# Patient Record
Sex: MALE | Race: BLACK OR AFRICAN AMERICAN | Employment: UNEMPLOYED | URBAN - METROPOLITAN AREA
[De-identification: names, ages, dates, MRNs, and addresses within clinical notes are randomized per-mention and may not be internally consistent; named-entity substitution may affect disease eponyms.]

---

## 2017-10-18 ENCOUNTER — HOSPITAL ENCOUNTER (EMERGENCY)
Age: 27
Discharge: HOME | End: 2017-10-18
Attending: EMERGENCY MEDICINE

## 2017-10-18 VITALS
BODY MASS INDEX: 24.38 KG/M2 | WEIGHT: 190 LBS | RESPIRATION RATE: 18 BRPM | SYSTOLIC BLOOD PRESSURE: 143 MMHG | OXYGEN SATURATION: 100 % | HEART RATE: 75 BPM | TEMPERATURE: 98.6 F | DIASTOLIC BLOOD PRESSURE: 77 MMHG | HEIGHT: 74 IN

## 2017-10-18 DIAGNOSIS — S39.012A STRAIN OF LUMBAR REGION, INITIAL ENCOUNTER: Primary | ICD-10-CM
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
## 2017-10-18 DIAGNOSIS — R03.0 ELEVATED BLOOD PRESSURE READING: ICD-10-CM

## 2017-10-18 PROCEDURE — 99283 EMERGENCY DEPT VISIT LOW MDM: CPT

## 2017-10-18 RX ORDER — IBUPROFEN 800 MG/1
800 TABLET ORAL EVERY 8 HOURS PRN
Qty: 30 TABLET | Refills: 0 | Status: SHIPPED | OUTPATIENT
Start: 2017-10-18 | End: 2018-12-31 | Stop reason: ALTCHOICE

## 2017-10-18 RX ORDER — CYCLOBENZAPRINE HCL 10 MG
10 TABLET ORAL 3 TIMES DAILY PRN
Qty: 30 TABLET | Refills: 0 | Status: SHIPPED | OUTPATIENT
Start: 2017-10-18 | End: 2017-10-28

## 2017-10-18 ASSESSMENT — PAIN SCALES - GENERAL: PAINLEVEL_OUTOF10: 6

## 2017-10-18 ASSESSMENT — PAIN DESCRIPTION - ORIENTATION: ORIENTATION: LOWER

## 2017-10-18 ASSESSMENT — PAIN DESCRIPTION - LOCATION: LOCATION: BACK

## 2017-10-18 ASSESSMENT — PAIN DESCRIPTION - FREQUENCY: FREQUENCY: CONTINUOUS

## 2017-10-18 ASSESSMENT — PAIN DESCRIPTION - DESCRIPTORS: DESCRIPTORS: ACHING;CONSTANT
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
## 2017-10-18 NOTE — ED PROVIDER NOTES
Constitutional: He is oriented to person, place, and time. He appears well-developed. HENT:   Head: Normocephalic and atraumatic. Eyes: EOM are normal.   Neck: Normal range of motion. Neck supple. No spinous process tenderness and no muscular tenderness present. No neck rigidity. No edema, no erythema and normal range of motion present. Cardiovascular: Normal rate and regular rhythm. Pulmonary/Chest: Effort normal and breath sounds normal.   Abdominal: Soft. Musculoskeletal: Normal range of motion. Lumbar back: He exhibits normal range of motion and no bony tenderness. Back:    Neurological: He is alert and oriented to person, place, and time. Skin: Skin is warm. Psychiatric: He has a normal mood and affect. His behavior is normal.               DIAGNOSTIC RESULTS     EKG: All EKG's are interpreted by the Emergency Department Physician who either signs or Co-signs this chart in the absence of a cardiologist.        RADIOLOGY:   Non-plain film images such as CT, Ultrasound and MRI are read by the radiologist. Plain radiographic images are visualized and preliminarily interpreted by the emergency physician with the below findings:        Interpretation per the Radiologist below, if available at the time of this note:          ED BEDSIDE ULTRASOUND:   Performed by ED Physician - none    LABS:  Labs Reviewed - No data to display    All other labs were within normal range or not returned as of this dictation. EMERGENCY DEPARTMENT COURSE and DIFFERENTIAL DIAGNOSIS/MDM:   Vitals:    Vitals:    10/18/17 1528   BP: (!) 143/77   Pulse: 75   Resp: 18   Temp: 98.6 °F (37 °C)   TempSrc: Oral   SpO2: 100%   Weight: 190 lb (86.2 kg)   Height: 6' 2\" (1.88 m)       X-rays not indicated. Patient will be treated symptomatically and discharged home. Patient is in agreement with plan of care.     Pre-hypertension/Hypertension: The patient has been informed that they may have pre-hypertension or

## 2018-12-31 ENCOUNTER — HOSPITAL ENCOUNTER (EMERGENCY)
Age: 28
Discharge: HOME | End: 2018-12-31
Attending: EMERGENCY MEDICINE

## 2018-12-31 VITALS
RESPIRATION RATE: 20 BRPM | WEIGHT: 221.7 LBS | TEMPERATURE: 98 F | BODY MASS INDEX: 28.45 KG/M2 | OXYGEN SATURATION: 99 % | HEIGHT: 74 IN | DIASTOLIC BLOOD PRESSURE: 80 MMHG | HEART RATE: 78 BPM | SYSTOLIC BLOOD PRESSURE: 145 MMHG

## 2018-12-31 DIAGNOSIS — L98.9 SKIN LESION OF LEFT UPPER EXTREMITY: Primary | ICD-10-CM

## 2018-12-31 PROCEDURE — 99282 EMERGENCY DEPT VISIT SF MDM: CPT

## 2019-03-24 ENCOUNTER — HOSPITAL ENCOUNTER (EMERGENCY)
Age: 29
Discharge: HOME | End: 2019-03-24
Attending: EMERGENCY MEDICINE

## 2019-03-24 ENCOUNTER — APPOINTMENT (OUTPATIENT)
Dept: GENERAL RADIOLOGY | Age: 29
End: 2019-03-24

## 2019-03-24 VITALS
TEMPERATURE: 98.8 F | HEART RATE: 71 BPM | DIASTOLIC BLOOD PRESSURE: 88 MMHG | WEIGHT: 180 LBS | BODY MASS INDEX: 23.1 KG/M2 | OXYGEN SATURATION: 99 % | HEIGHT: 74 IN | SYSTOLIC BLOOD PRESSURE: 147 MMHG | RESPIRATION RATE: 16 BRPM

## 2019-03-24 DIAGNOSIS — S93.401A SPRAIN OF RIGHT ANKLE, UNSPECIFIED LIGAMENT, INITIAL ENCOUNTER: Primary | ICD-10-CM

## 2019-03-24 PROCEDURE — 6370000000 HC RX 637 (ALT 250 FOR IP): Performed by: EMERGENCY MEDICINE

## 2019-03-24 PROCEDURE — 73630 X-RAY EXAM OF FOOT: CPT

## 2019-03-24 PROCEDURE — 99283 EMERGENCY DEPT VISIT LOW MDM: CPT

## 2019-03-24 RX ORDER — IBUPROFEN 800 MG/1
800 TABLET ORAL ONCE
Status: COMPLETED | OUTPATIENT
Start: 2019-03-24 | End: 2019-03-24

## 2019-03-24 RX ORDER — IBUPROFEN 800 MG/1
800 TABLET ORAL EVERY 8 HOURS PRN
Qty: 30 TABLET | Refills: 0 | Status: SHIPPED | OUTPATIENT
Start: 2019-03-24 | End: 2019-05-09 | Stop reason: ALTCHOICE

## 2019-03-24 RX ADMIN — IBUPROFEN 800 MG: 800 TABLET, FILM COATED ORAL at 11:19

## 2019-03-24 ASSESSMENT — ENCOUNTER SYMPTOMS
BACK PAIN: 0
DIARRHEA: 0
COUGH: 0
NAUSEA: 0
VOMITING: 0
SHORTNESS OF BREATH: 0
ABDOMINAL PAIN: 0
CHEST TIGHTNESS: 0

## 2019-03-24 ASSESSMENT — PAIN SCALES - GENERAL
PAINLEVEL_OUTOF10: 10
PAINLEVEL_OUTOF10: 10

## 2019-05-09 ENCOUNTER — HOSPITAL ENCOUNTER (EMERGENCY)
Age: 29
Discharge: HOME | End: 2019-05-09
Attending: EMERGENCY MEDICINE

## 2019-05-09 VITALS
DIASTOLIC BLOOD PRESSURE: 87 MMHG | SYSTOLIC BLOOD PRESSURE: 158 MMHG | WEIGHT: 190 LBS | TEMPERATURE: 99 F | OXYGEN SATURATION: 100 % | HEIGHT: 74 IN | BODY MASS INDEX: 24.38 KG/M2 | HEART RATE: 92 BPM

## 2019-05-09 DIAGNOSIS — S01.511A LIP LACERATION, INITIAL ENCOUNTER: ICD-10-CM
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
## 2019-05-09 PROCEDURE — 99283 EMERGENCY DEPT VISIT LOW MDM: CPT

## 2019-05-09 ASSESSMENT — ENCOUNTER SYMPTOMS
COLOR CHANGE: 0
VOMITING: 0
EYE DISCHARGE: 0
ABDOMINAL PAIN: 0
CHEST TIGHTNESS: 0
NAUSEA: 0
SHORTNESS OF BREATH: 0
EYE REDNESS: 0

## 2019-05-09 ASSESSMENT — PAIN SCALES - GENERAL
PAINLEVEL_OUTOF10: 3
PAINLEVEL_OUTOF10: 3

## 2019-05-09 ASSESSMENT — PAIN DESCRIPTION - DESCRIPTORS
DESCRIPTORS: ACHING
DESCRIPTORS: ACHING

## 2019-05-09 ASSESSMENT — PAIN DESCRIPTION - ONSET
ONSET: SUDDEN
ONSET: SUDDEN

## 2019-05-09 ASSESSMENT — PAIN DESCRIPTION - LOCATION
LOCATION: FACE
LOCATION: FACE;OTHER (COMMENT)

## 2019-05-09 ASSESSMENT — PAIN DESCRIPTION - FREQUENCY
FREQUENCY: CONTINUOUS
FREQUENCY: CONTINUOUS
FREQUENCY: INTERMITTENT
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
## 2020-08-10 ENCOUNTER — HOSPITAL ENCOUNTER (EMERGENCY)
Age: 30
Discharge: HOME | End: 2020-08-10
Attending: EMERGENCY MEDICINE
Payer: MEDICARE

## 2020-08-10 VITALS
RESPIRATION RATE: 18 BRPM | HEART RATE: 79 BPM | TEMPERATURE: 98.3 F | OXYGEN SATURATION: 99 % | DIASTOLIC BLOOD PRESSURE: 103 MMHG | SYSTOLIC BLOOD PRESSURE: 150 MMHG

## 2020-08-10 PROCEDURE — 99282 EMERGENCY DEPT VISIT SF MDM: CPT

## 2020-08-10 RX ORDER — ACETAMINOPHEN 325 MG/1
325 TABLET ORAL EVERY 6 HOURS PRN
Qty: 120 TABLET | Refills: 0 | Status: SHIPPED | OUTPATIENT
Start: 2020-08-10

## 2020-08-10 RX ORDER — IBUPROFEN 400 MG/1
400 TABLET ORAL EVERY 6 HOURS PRN
Qty: 120 TABLET | Refills: 0 | Status: SHIPPED | OUTPATIENT
Start: 2020-08-10

## 2020-08-10 ASSESSMENT — ENCOUNTER SYMPTOMS
TROUBLE SWALLOWING: 0
BACK PAIN: 0
ABDOMINAL DISTENTION: 0
RHINORRHEA: 0
SHORTNESS OF BREATH: 0
DIARRHEA: 0
SORE THROAT: 0
NAUSEA: 0
CHEST TIGHTNESS: 0
ROS SKIN COMMENTS: LEFT SHOULDER MASS
COUGH: 0
ABDOMINAL PAIN: 0
CONSTIPATION: 0
VOMITING: 0
WHEEZING: 0

## 2020-08-10 NOTE — ED PROVIDER NOTES
[facility]  ED  Emergency Department Encounter  Emergency Medicine Resident     Pt Name: Prema Deluca  MRN: [id number]  [place] 1990  Date of evaluation: 8/10/20  PCP:  No primary care provider on file. CHIEF COMPLAINT       No chief complaint on file. HISTORY OFPRESENT ILLNESS  (Location/Symptom, Timing/Onset, Context/Setting, Quality, Duration, Modifying Factors,Severity.)      Prema Deluca is a 31 y.o. male who presents with concerns of growth of the mass on his left shoulder approximately 2 years duration. Patient states that he does not remember the exact time that symptoms began but states that they have gotten worse for the past couple of weeks to come the emergency department. Patient denies fever, chills, shortness of breath, tingling, does endorse headache and pain of the neck close to the area of mass. PAST MEDICAL / SURGICAL / SOCIAL / FAMILY HISTORY      has no past medical history on file. has no past surgical history on file. Social History     Socioeconomic History    Marital status: Single     Spouse name: Not on file    Number of children: Not on file    Years of education: Not on file    Highest education level: Not on file   Occupational History    Not on file   Social Needs    Financial resource strain: Not on file    Food insecurity     Worry: Not on file     Inability: Not on file    Transportation needs     Medical: Not on file     Non-medical: Not on file   Tobacco Use    Smoking status: Current Every Day Smoker    Smokeless tobacco: Never Used   Substance and Sexual Activity    Alcohol use:  Yes    Drug use: Yes     Types: Marijuana    Sexual activity: Not on file   Lifestyle    Physical activity     Days per week: Not on file     Minutes per session: Not on file    Stress: Not on file   Relationships    Social connections     Talks on phone: Not on file     Gets together: Not on file     Attends Christianity service: Not on file     Active member of club or organization: Not on file     Attends meetings of clubs or organizations: Not on file     Relationship status: Not on file    Intimate partner violence     Fear of current or ex partner: Not on file     Emotionally abused: Not on file     Physically abused: Not on file     Forced sexual activity: Not on file   Other Topics Concern    Not on file   Social History Narrative    Not on file       History reviewed. No pertinent family history. Allergies:  Patient has no known allergies. Home Medications:  Prior to Admission medications    Medication Sig Start Date End Date Taking? Authorizing Provider   acetaminophen (TYLENOL) 325 MG tablet Take 1 tablet by mouth every 6 hours as needed for Pain 8/10/20  Yes Fidel Lozano MD   ibuprofen (IBU) 400 MG tablet Take 1 tablet by mouth every 6 hours as needed for Pain 8/10/20  Yes Fidel Lozano MD       REVIEW OFSYSTEMS    (2-9 systems for level 4, 10 or more for level 5)      Review of Systems   Constitutional: Negative for chills, diaphoresis, fatigue and fever. HENT: Negative for rhinorrhea, sore throat, tinnitus and trouble swallowing. Eyes: Negative for visual disturbance. Respiratory: Negative for cough, chest tightness, shortness of breath and wheezing. Cardiovascular: Negative for chest pain and leg swelling. Gastrointestinal: Negative for abdominal distention, abdominal pain, constipation, diarrhea, nausea and vomiting. Endocrine: Negative for polyuria. Genitourinary: Negative for dysuria, flank pain and frequency. Musculoskeletal: Negative for arthralgias, back pain, joint swelling and myalgias. Skin:        Left shoulder mass   Neurological: Negative for dizziness, tremors, seizures, weakness, light-headedness, numbness and headaches.        PHYSICAL EXAM   (up to 7 for level 4, 8 or more forlevel 5)      INITIAL VITALS:   ED Triage Vitals   BP Temp Temp src Pulse Resp SpO2 Height Weight   -- -- -- -- -- -- -- --       Physical Exam  Constitutional:       General: He is not in acute distress. Appearance: He is well-developed. HENT:      Head: Normocephalic and atraumatic. Right Ear: External ear normal.      Left Ear: External ear normal.   Eyes:      General: No scleral icterus. Right eye: No discharge. Left eye: No discharge. Conjunctiva/sclera: Conjunctivae normal.      Pupils: Pupils are equal, round, and reactive to light. Neck:      Musculoskeletal: Normal range of motion. Vascular: No JVD. Trachea: No tracheal deviation. Cardiovascular:      Rate and Rhythm: Regular rhythm. Heart sounds: Normal heart sounds. Pulmonary:      Effort: Pulmonary effort is normal. No respiratory distress. Breath sounds: Normal breath sounds. No stridor. No wheezing. Abdominal:      General: Bowel sounds are normal. There is no distension. Palpations: Abdomen is soft. Tenderness: There is no abdominal tenderness. There is no guarding or rebound. Musculoskeletal: Normal range of motion. General: No tenderness or deformity. Skin:     General: Skin is warm. Coloration: Skin is not pale. Comments: 3 cm x 3 cm fibrinous mass of the left shoulder, bedside ultrasound showed vague sporadic fluid collections, no erythema, no redness, no warmth to the touch. Neurological:      Mental Status: He is alert and oriented to person, place, and time. Cranial Nerves: No cranial nerve deficit. DIFFERENTIAL  DIAGNOSIS     PLAN (LABS / IMAGING / EKG):  No orders of the defined types were placed in this encounter.       MEDICATIONS ORDERED:  Orders Placed This Encounter   Medications    acetaminophen (TYLENOL) 325 MG tablet     Sig: Take 1 tablet by mouth every 6 hours as needed for Pain     Dispense:  120 tablet     Refill:  0    ibuprofen (IBU) 400 MG tablet     Sig: Take 1 tablet by mouth every 6 hours as needed for Pain     Dispense:  120 tablet were completed with a voice recognition program.Efforts were made to edit the dictations but occasionally words are mis-transcribed.)        Erasto Farley MD  Resident  08/10/20 [id number]

## 2020-08-10 NOTE — ED NOTES
Pt reports to the ED via triage with c/o lump on shoulder. Pt states for about x2 years he has had a lump on his lt shoulder that is getting bigger, pt states it is hard and gets tender at times. Pt is A&Ox4, RR even and non labored.       Jun Conner RN  08/10/20 [id number]

## 2020-08-10 NOTE — ED PROVIDER NOTES
[facility] ED     Emergency Department     Faculty Attestation        I performed a history and physical examination of the patient and discussed management with the resident. I reviewed the residents note and agree with the documented findings and plan of care. Any areas of disagreement are noted on the chart. I was personally present for the key portions of any procedures. I have documented in the chart those procedures where I was not present during the key portions. I have reviewed the emergency nurses triage note. I agree with the chief complaint, past medical history, past surgical history, allergies, medications, social and family history as documented unless otherwise noted below. For mid-level providers such as nurse practitioners as well as physicians assistants:    I have personally seen and evaluated the patient. I find the patient's history and physical exam are consistent with NP/PA documentation. I agree with the care provided, treatment rendered, disposition, & follow-up plan. Additional findings are as noted. Vital Signs: BP (!) 150/103   Pulse 79   Temp 98.3 °F (36.8 °C) (Oral)   Resp 18   SpO2 99%   PCP:  No primary care provider on file. Pertinent Comments:     Mass on left shoulder that is been there for multiple years and slowly getting larger. It appears benign in nature. There is no erythema or warmth. Given the fact that is enlarging ultrasound and arrange follow-up to have removal as an outpatient.       Critical Care  None          Tim Muñoz MD  Attending Emergency Medicine Physician              Brenda Magana MD  08/10/20 [id number]

## 2020-10-22 ENCOUNTER — TELEPHONE (OUTPATIENT)
Dept: RADIATION ONCOLOGY | Facility: MEDICAL CENTER | Age: 30
End: 2020-10-22

## 2020-10-22 NOTE — TELEPHONE ENCOUNTER
10/22/2020 I called Peyton Whitney on 10/20/2020 to remind him of his consult on 10/21/2020. He wanted to change appointment.  I rescheduled him for 10/29/2020

## 2020-10-29 ENCOUNTER — HOSPITAL ENCOUNTER (OUTPATIENT)
Dept: RADIATION ONCOLOGY | Facility: MEDICAL CENTER | Age: 30
Discharge: HOME | End: 2020-10-29
Payer: MEDICARE

## 2020-10-29 VITALS
OXYGEN SATURATION: 98 % | HEART RATE: 74 BPM | RESPIRATION RATE: 16 BRPM | BODY MASS INDEX: 33.9 KG/M2 | TEMPERATURE: 98.1 F | HEIGHT: 74 IN | DIASTOLIC BLOOD PRESSURE: 75 MMHG | SYSTOLIC BLOOD PRESSURE: 130 MMHG | WEIGHT: 264.13 LBS

## 2020-10-29 PROCEDURE — 99204 OFFICE O/P NEW MOD 45 MIN: CPT | Performed by: RADIOLOGY

## 2020-10-29 PROCEDURE — 77263 THER RADIOLOGY TX PLNG CPLX: CPT | Performed by: RADIOLOGY

## 2020-10-29 PROCEDURE — 99213 OFFICE O/P EST LOW 20 MIN: CPT

## 2020-10-29 NOTE — PROGRESS NOTES
Referring Physician: Xander      Patient started having irritation in the left shoulder a few months ago. Patient went to the ER for a work up. No pain currently, just irritation. Shoulder surgery 10-2-20. Dr. Castro Escort to radha.    Vitals:    10/29/20 0918   BP: 130/75   Pulse: 74   Resp: 16   Temp: 98.1 °F (36.7 °C)   SpO2: 98%    :  Patient Currently in Pain: No             Wt Readings from Last 1 Encounters:   10/29/20 264 lb 2 oz (119.8 kg)        Body mass index is 33.91 kg/m². Height: 6' 2\" (188 cm)         Immunizations:    Influenza status:    []   Current   [x]   Patient declined    Pneumococcal status:  []   Current  [x]   Patient declined    Smoking Status:    [x] Smoker - PPD:0.25   [] Nonsmoker - Quit Date:               [] Never a smoker      No chief complaint on file. Cancer Staging  No matching staging information was found for the patient. Prior Radiation Therapy? No   If yes, site treated:   Facility:                             Date:    Concurrent Chemo/radiation? No   If yes, start date:    Prior Chemotherapy? No   If yes    Facility:                             Date:    Prior Hormonal Therapy? No   If yes   Facility:                             Date:    Head and Neck Cancer? No   If yes, please remind physician to place swallow study order and speech therapy order. Pacemaker/Defibulator/ICD:  No             BREAST/GYN  Patient only:     LMP:    Age at first Menses:    Para:    :    Cup size:    Lymphedema Evaluation[de-identified]   [] left arm      [] right arm  Location:     Measurement (cm)    Upper Bicep :    Lower Bicep :           Current Outpatient Medications   Medication Sig Dispense Refill    acetaminophen (TYLENOL) 325 MG tablet Take 1 tablet by mouth every 6 hours as needed for Pain 120 tablet 0    ibuprofen (IBU) 400 MG tablet Take 1 tablet by mouth every 6 hours as needed for Pain 120 tablet 0     No current facility-administered medications for this encounter. Past Medical History:   Diagnosis Date    Cancer [facility])        Past Surgical History:   Procedure Laterality Date    SHOULDER SURGERY Left        Family History   Problem Relation Age of Onset    Cancer Paternal Grandmother        Social History     Socioeconomic History    Marital status: Single     Spouse name: Not on file    Number of children: Not on file    Years of education: Not on file    Highest education level: Not on file   Occupational History    Not on file   Social Needs    Financial resource strain: Not on file    Food insecurity     Worry: Not on file     Inability: Not on file    Transportation needs     Medical: Not on file     Non-medical: Not on file   Tobacco Use    Smoking status: Current Every Day Smoker     Packs/day: 0.25     Years: 10.00     Pack years: 2.50     Types: Cigarettes    Smokeless tobacco: Never Used   Substance and Sexual Activity    Alcohol use: Not Currently    Drug use: Yes     Types: Marijuana     Comment: occassionally    Sexual activity: Not on file   Lifestyle    Physical activity     Days per week: Not on file     Minutes per session: Not on file    Stress: Not on file   Relationships    Social connections     Talks on phone: Not on file     Gets together: Not on file     Attends Congregational service: Not on file     Active member of club or organization: Not on file     Attends meetings of clubs or organizations: Not on file     Relationship status: Not on file    Intimate partner violence     Fear of current or ex partner: Not on file     Emotionally abused: Not on file     Physically abused: Not on file     Forced sexual activity: Not on file   Other Topics Concern    Not on file   Social History Narrative    Not on file             FALLS RISK SCREEN  Instructions:  Assess the patient and enter the appropriate indicators that are present for fall risk identification. Total the numbers entered and assign a fall risk score from Table 2. Reassess patient at a minimum every 12 weeks or with status change. Assessment   Date  10/29/2020     1. Mental Ability: confusion/cognitively impaired 0     2. Elimination Issues: incontinence, frequency 0       3. Ambulatory: use of assistive devices (walker, cane, off-loading devices),        attached to equipment (IV pole, oxygen) 0     4. Sensory Limitations: dizziness, vertigo, impaired vision 0     5. Age less than 65        0     6. Age 72 or greater 0     7. Medication: diuretics, strong analgesics, hypnotics, sedatives,        antihypertensive agents 0   8. Falls:  recent history of falls within the last 3 months (not to include slipping or        tripping) 0   TOTAL 0    If score of 4 or greater was education given? No       TABLE 2   Risk Score Risk Level Plan of Care   0-3 Little or  No Risk 1. Provide assistance as indicated for ambulation activities  2. Reorient confused/cognitively impaired patient  3. Call-light/bell within patient's reach  4. Chair/bed in low position, stretcher/bed with siderails up except when performing patient care activities  5. Educate patient/family/caregiver on falls prevention  6.  Reassess in 12 weeks or with any noted change in patient condition which places them at a risk for a fall   4-6 Moderate Risk 1. Provide assistance as indicated for ambulation activities  2. Reorient confused/cognitively impaired patient  3. Call-light/bell within patient's reach  4. Chair/bed in low position, stretcher/bed with siderails up except when performing patient care activities  5. Educate patient/family/caregiver on falls prevention     7 or   Higher High Risk 1. Place patient in easily observable treatment room  2. Patient attended at all times by family member or staff  3. Provide assistance as indicated for ambulation activities  4. Reorient confused/cognitively impaired patient  5. Call-light/bell within patient's reach  6.   Chair/bed in low position, stretcher/bed with siderails up except when performing patient care activities  7. Educate patient/family/caregiver on falls prevention             Assessment/Plan: Patient was seen today for consultation.          Letty Torres 10/29/2020 9:22 AM

## 2020-10-31 NOTE — CONSULTS
[facility] Oncology  NEW PATIENT CONSULTATION    Date of Service: 10/29/2020  Location: [facility]    Patient ID:   Lorena Hernández  : 1990   MRN: [id number]    Diagnosis:     Chief Complaint: \"I have cancer. \"  Dear Dr. Ebenezer Franks,    Thank you for requesting a Radiation Oncology consultation on your patient, Lorena Hernández. 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 On 10 2 he underwent second excision and the tumor was 10 cm at maximum diameter margins are free I will be calling the pathology department to find out how far is the margin. Patient tolerated procedure quite well with no significant problem wound healed well. He denied any other symptoms.     Medical and Surgical History:  Past Medical History:   Diagnosis Date    Cancer [facility])      Past Surgical History:   Procedure Laterality Date    SHOULDER SURGERY Left      Family History   Problem Relation Age of Onset    Cancer Paternal Grandmother       Social History     Socioeconomic History    Marital status: Single     Spouse name: None    Number of children: None    Years of education: None    Highest education level: None   Occupational History    None   Social Needs    Financial resource strain: None    Food insecurity     Worry: None     Inability: None    Transportation needs     Medical: None     Non-medical: None   Tobacco Use    Smoking status: Current Every Day Smoker     Packs/day: 0.25     Years: 10.00     Pack years: 2.50     Types: Cigarettes    Smokeless tobacco: Never Used   Substance and Sexual Activity    Alcohol use: Not Currently    Drug use: Yes     Types: Marijuana     Comment: occassionally    Sexual activity: None   Lifestyle    Physical activity     Days per week: None     Minutes per session: None    Stress: None Relationships    Social connections     Talks on phone: None     Gets together: None     Attends Episcopalian service: None     Active member of club or organization: None     Attends meetings of clubs or organizations: None     Relationship status: None    Intimate partner violence     Fear of current or ex partner: None     Emotionally abused: None     Physically abused: None     Forced sexual activity: None   Other Topics Concern    None   Social History Narrative    None        Gyn Hx:    No Known Allergies    Current Outpatient Medications:     acetaminophen (TYLENOL) 325 MG tablet, Take 1 tablet by mouth every 6 hours as needed for Pain, Disp: 120 tablet, Rfl: 0    ibuprofen (IBU) 400 MG tablet, Take 1 tablet by mouth every 6 hours as needed for Pain, Disp: 120 tablet, Rfl: 0      SYSTEMS REVIEW:  I reviewed the complete 14-Point Review of Systems with the patient. Pertinent ones noted in the HPI and below. ROS(+)  ROS(-)    PHYSICAL EXAMINATION:  Vital Signs: /75   Pulse 74   Temp 98.1 °F (36.7 °C) (Oral)   Resp 16   Ht 6' 2\" (1.88 m)   Wt 264 lb 2 oz (119.8 kg)   SpO2 98%   BMI 33.91 kg/m²   Pain 0/10, KPS . GENERAL: Well-appearing, in no apparent distress, alert and fully oriented, answers questions appropriately. HEENT: Normocephalic, atraumatic, PER, EOMI, on inspection of the oral cavity and visible oropharynx, mucous membranes moist, normal dentition, uvula and soft palate elevate symmetrically on phonation. No suspicious asymmetry or mucosal lesions appreciated. NECK: No thyromegaly, cervical or supraclavicular lymphadenopathy. A well-healed scar was seen in the back of the shoulder. ABD: Normoactive bowel sounds, soft, nontender, no visceromegaly or masses appreciated. LYMPH: No lymphadenopathy appreciated. EXT: Full range of motion in all extremities. No cyanosis/clubbing or edema.   NEURO: Alert and fully oriented, answers questions appropriately, speech is fluent without dysarthria, muscle power in all extremities 5/5. PSYCH: Affect is appropriate for clinical situation. Insight and judgment do not appear impaired. LABS:    RADS:    PATHOLOGY: Fibrosarcoma completely removed. 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 I will be reviewing the pathology with the pathologist to find out how far as the margin which I would expect it to be small since the area would and would not allow big margin. PLAN: This patient required postoperative radiation he will need 6600 cGy delivered in 33 treatment. This treatment will reduce the chance of recurrence significantly from approximately 30% to 50% to under 10%. Early and delayed side effects were explained to him. Early side effect skin erythema desquamation uncommonly moist desquamation or ulceration. This acute reaction would resolve. Late effect would be in the form of increased or decreased skin pigmentation and fibrosis. With careful treatment planning fibrosis will be a minimum. Patient understand benefits and early and delayed side effects and agreed to the treatment again at like thank you very much for letting us participate in his care and we will keep you updated on him       Nicole Garcia MD, [address] [place] [address]  [phone] (cell)    I spent 60 minutes with the patient. >50% of the time was allotted to education, answering questions, and coordinating care. CC:  Patient Care Team:  Gifty Jorgensen DO as PCP - General (General Surgery)  Gifty Jorgensen DO as Surgeon (General Surgery)  Lizabeth Bose MD as Consulting Physician (Radiation Oncology)     N.B. This note is created with the assistance of a speech recognition program.  While intending to generate a document that actually reflects the content of the visit, the document can still contain errors including those of syntax and sound-alike substitutions that may escape proof reading.   In such instances, actual meaning can be extrapolated by contextual diversion.

## 2020-11-02 NOTE — PROGRESS NOTES
[facility] Nutrition Note  PG-SGA screening tool reviewed with score of 0    Patient Reports:  1. Weight: no change (0) - UBW of 264 lb  2. Food Intake: no change (0)  3. Symptoms: no problems eating (0)  4. Activities and function: normal with no limitations (0)    *Full assessment for scores > 4. Height: 6' 2\" (188 cm)   Current Weight: 264 lb 2 oz (119.8 kg)  BMI: Body mass index is 33.91 kg/m². Recommend monitoring patient's weight and for potential side effects from CA itself and/or tx.     Barbara Boyer RDN, LORIN  RD Office Phone: [phone]

## 2020-11-06 ENCOUNTER — HOSPITAL ENCOUNTER (OUTPATIENT)
Dept: RADIATION ONCOLOGY | Facility: MEDICAL CENTER | Age: 30
Discharge: HOME | End: 2020-11-06
Payer: MEDICARE

## 2020-11-06 PROCEDURE — 77263 THER RADIOLOGY TX PLNG CPLX: CPT | Performed by: RADIOLOGY

## 2020-11-06 PROCEDURE — 77334 RADIATION TREATMENT AID(S): CPT

## 2020-11-12 ENCOUNTER — SOCIAL WORK (OUTPATIENT)
Dept: ONCOLOGY | Age: 30
End: 2020-11-12

## 2020-11-12 NOTE — PROGRESS NOTES
FEDERICO reviewed patient's psychosocial distress screen and he has no needs at this time. Patient has [employer] insurance in place. Michael Vu.  Shivam Kinsey

## 2020-11-16 ENCOUNTER — TELEPHONE (OUTPATIENT)
Dept: RADIATION ONCOLOGY | Facility: MEDICAL CENTER | Age: 30
End: 2020-11-16

## 2020-11-18 ENCOUNTER — HOSPITAL ENCOUNTER (OUTPATIENT)
Dept: RADIATION ONCOLOGY | Facility: MEDICAL CENTER | Age: 30
Discharge: HOME | End: 2020-11-18
Payer: MEDICARE

## 2020-11-18 PROCEDURE — 77300 RADIATION THERAPY DOSE PLAN: CPT | Performed by: RADIOLOGY

## 2020-11-18 PROCEDURE — 77301 RADIOTHERAPY DOSE PLAN IMRT: CPT | Performed by: RADIOLOGY

## 2020-11-18 PROCEDURE — 77338 DESIGN MLC DEVICE FOR IMRT: CPT

## 2020-11-18 PROCEDURE — 77338 DESIGN MLC DEVICE FOR IMRT: CPT | Performed by: RADIOLOGY

## 2020-11-18 PROCEDURE — 77300 RADIATION THERAPY DOSE PLAN: CPT

## 2020-11-18 PROCEDURE — 77301 RADIOTHERAPY DOSE PLAN IMRT: CPT

## 2020-11-24 ENCOUNTER — HOSPITAL ENCOUNTER (OUTPATIENT)
Dept: RADIATION ONCOLOGY | Facility: MEDICAL CENTER | Age: 30
End: 2020-11-24
Payer: MEDICARE

## 2020-11-24 ENCOUNTER — HOSPITAL ENCOUNTER (OUTPATIENT)
Dept: RADIATION ONCOLOGY | Facility: MEDICAL CENTER | Age: 30
Discharge: HOME | End: 2020-11-24
Payer: MEDICARE

## 2020-11-24 PROCEDURE — 77386 HC NTSTY MODUL RAD TX DLVR CPLX: CPT

## 2020-11-24 PROCEDURE — 77014 PR CT GUIDANCE PLACEMENT RAD THERAPY FIELDS: CPT | Performed by: RADIOLOGY

## 2020-11-25 ENCOUNTER — HOSPITAL ENCOUNTER (OUTPATIENT)
Dept: RADIATION ONCOLOGY | Facility: MEDICAL CENTER | Age: 30
Discharge: HOME | End: 2020-11-25
Payer: MEDICARE

## 2020-11-25 PROCEDURE — 77014 PR CT GUIDANCE PLACEMENT RAD THERAPY FIELDS: CPT | Performed by: RADIOLOGY

## 2020-11-25 PROCEDURE — 77386 HC NTSTY MODUL RAD TX DLVR CPLX: CPT

## 2020-11-25 PROCEDURE — 77336 RADIATION PHYSICS CONSULT: CPT

## 2020-11-30 ENCOUNTER — HOSPITAL ENCOUNTER (OUTPATIENT)
Dept: RADIATION ONCOLOGY | Facility: MEDICAL CENTER | Age: 30
Discharge: HOME | End: 2020-11-30
Payer: MEDICARE

## 2020-11-30 VITALS
WEIGHT: 261.13 LBS | DIASTOLIC BLOOD PRESSURE: 78 MMHG | HEART RATE: 75 BPM | TEMPERATURE: 97 F | SYSTOLIC BLOOD PRESSURE: 129 MMHG | BODY MASS INDEX: 33.53 KG/M2 | OXYGEN SATURATION: 97 % | RESPIRATION RATE: 16 BRPM

## 2020-11-30 PROCEDURE — 77014 PR CT GUIDANCE PLACEMENT RAD THERAPY FIELDS: CPT | Performed by: RADIOLOGY

## 2020-11-30 PROCEDURE — 77386 HC NTSTY MODUL RAD TX DLVR CPLX: CPT

## 2020-11-30 NOTE — PROGRESS NOTES
nt: Bogdan Burnett     : 1990      Date of Service: 2020    [facility] Oncology  On Treatment Visit (OTV) Note    Diagnosis: Cancer Staging  No matching staging information was found for the patient. Dose Accrued: Completed 600 cGy out of 4600 cGy, 3 out of 23 treatments. S: No complaint. O: There were no vitals taken for this visit. .  Well-appearing, in no apparent distress, alert and fully oriented, answers questions appropriately. No signs of acute radiation-induced toxicity on physical exam.    IGRT: Set-up images acquired to ensure daily treatment accuracy and precision were reviewed by the physician. A&P: Continue radiotherapy as planned. Moisturize treated area as instructed. We reviewed reasonably anticipated side effects in the upcoming weeks, as well as expected trajectory of recovery. Patient verbalized a good understanding to everything.     Electronically signed by Alia Vasquez MD on 2020 at 5:12 PM

## 2020-11-30 NOTE — PROGRESS NOTES
[facility]  11/30/2020  Wt Readings from Last 3 Encounters:   11/30/20 261 lb 2 oz (118.4 kg)   10/29/20 264 lb 2 oz (119.8 kg)   05/09/19 190 lb (86.2 kg)     Body mass index is 33.53 kg/m². Patient here for OTV. Dr. Patterson Brought to [facility].    Treatment Area:shoulder    Patient was seen today for weekly visit. Comfort Alteration  Fatigue: None      Nutritional Alteration  Anorexia: No   Nausea: No   Vomiting: No     Elimination Alterations  Constipation: no  Diarrhea:  no  Bowel Incontinence: No  Urinary Incontinence: No    Skin Alteration   Sensation:none    Radiation Dermatitis:  Intact [x]     Erythema  []     Discoloration  []     Rash []     Dry desquamation  []     Moist desquamation []       Emotional  Coping: effective      Injury, potential bleeding or infection: none    No results found for: WBC, PLT      /78   Pulse 75   Temp 97 °F (36.1 °C) (Oral)   Resp 16   Wt 261 lb 2 oz (118.4 kg)   SpO2 97%   BMI 33.53 kg/m²   Patient Currently in Pain: No                 Assessment/Plan: Patient was seen today for weekly visit.       Anay Lucas

## 2020-12-01 ENCOUNTER — HOSPITAL ENCOUNTER (OUTPATIENT)
Dept: RADIATION ONCOLOGY | Facility: MEDICAL CENTER | Age: 30
Discharge: HOME | End: 2020-12-01
Payer: MEDICARE

## 2020-12-01 PROCEDURE — 77386 HC NTSTY MODUL RAD TX DLVR CPLX: CPT

## 2020-12-01 PROCEDURE — 77014 PR CT GUIDANCE PLACEMENT RAD THERAPY FIELDS: CPT | Performed by: RADIOLOGY

## 2020-12-02 ENCOUNTER — HOSPITAL ENCOUNTER (OUTPATIENT)
Dept: RADIATION ONCOLOGY | Facility: MEDICAL CENTER | Age: 30
Discharge: HOME | End: 2020-12-02
Payer: MEDICARE

## 2020-12-02 PROCEDURE — 77014 PR CT GUIDANCE PLACEMENT RAD THERAPY FIELDS: CPT | Performed by: RADIOLOGY

## 2020-12-02 PROCEDURE — 77427 RADIATION TX MANAGEMENT X5: CPT | Performed by: RADIOLOGY

## 2020-12-02 PROCEDURE — 77386 HC NTSTY MODUL RAD TX DLVR CPLX: CPT

## 2020-12-03 ENCOUNTER — HOSPITAL ENCOUNTER (OUTPATIENT)
Dept: RADIATION ONCOLOGY | Facility: MEDICAL CENTER | Age: 30
Discharge: HOME | End: 2020-12-03
Payer: MEDICARE

## 2020-12-03 PROCEDURE — 77014 PR CT GUIDANCE PLACEMENT RAD THERAPY FIELDS: CPT | Performed by: RADIOLOGY

## 2020-12-03 PROCEDURE — 77386 HC NTSTY MODUL RAD TX DLVR CPLX: CPT

## 2020-12-04 ENCOUNTER — HOSPITAL ENCOUNTER (OUTPATIENT)
Dept: RADIATION ONCOLOGY | Facility: MEDICAL CENTER | Age: 30
Discharge: HOME | End: 2020-12-04
Payer: MEDICARE

## 2020-12-04 PROCEDURE — 77014 PR CT GUIDANCE PLACEMENT RAD THERAPY FIELDS: CPT | Performed by: RADIOLOGY

## 2020-12-04 PROCEDURE — 77386 HC NTSTY MODUL RAD TX DLVR CPLX: CPT

## 2020-12-07 ENCOUNTER — HOSPITAL ENCOUNTER (OUTPATIENT)
Dept: RADIATION ONCOLOGY | Facility: MEDICAL CENTER | Age: 30
Discharge: HOME | End: 2020-12-07
Payer: MEDICARE

## 2020-12-07 VITALS
SYSTOLIC BLOOD PRESSURE: 140 MMHG | WEIGHT: 254 LBS | DIASTOLIC BLOOD PRESSURE: 85 MMHG | BODY MASS INDEX: 32.61 KG/M2 | OXYGEN SATURATION: 97 % | HEART RATE: 75 BPM | RESPIRATION RATE: 16 BRPM | TEMPERATURE: 97 F

## 2020-12-07 PROCEDURE — 77014 PR CT GUIDANCE PLACEMENT RAD THERAPY FIELDS: CPT | Performed by: RADIOLOGY

## 2020-12-07 PROCEDURE — 77386 HC NTSTY MODUL RAD TX DLVR CPLX: CPT

## 2020-12-07 NOTE — PROGRESS NOTES
nt: David Montiel     : 1990      Date of Service: 2020    [facility] Oncology  On Treatment Visit (OTV) Note    Diagnosis: Cancer Staging  No matching staging information was found for the patient. Dose Accrued: Completed 1600 cGy out of 4600 cGy    S: No complaint  O: BP (!) 140/85   Pulse 75   Temp 97 °F (36.1 °C) (Oral)   Resp 16   Wt 254 lb (115.2 kg)   SpO2 97%   BMI 32.61 kg/m² . Well-appearing, in no apparent distress, alert and fully oriented, answers questions appropriately. No signs of acute radiation-induced toxicity on physical exam.    IGRT: Set-up images acquired to ensure daily treatment accuracy and precision were reviewed by the physician. A&P: Continue radiotherapy as planned. Moisturize treated area as instructed. We reviewed reasonably anticipated side effects in the upcoming weeks, as well as expected trajectory of recovery. Patient verbalized a good understanding to everything.     Electronically signed by Balbir Alexander MD on 2020 at 4:38 PM

## 2020-12-08 ENCOUNTER — HOSPITAL ENCOUNTER (OUTPATIENT)
Dept: RADIATION ONCOLOGY | Facility: MEDICAL CENTER | Age: 30
Discharge: HOME | End: 2020-12-08
Payer: MEDICARE

## 2020-12-08 PROCEDURE — 77386 HC NTSTY MODUL RAD TX DLVR CPLX: CPT

## 2020-12-08 PROCEDURE — 77014 PR CT GUIDANCE PLACEMENT RAD THERAPY FIELDS: CPT | Performed by: RADIOLOGY

## 2020-12-08 NOTE — PROGRESS NOTES
Nutrition Brief: Wt loss trigger    Wt Readings from Last 5 Encounters:   12/07/20 254 lb (115.2 kg)   11/30/20 261 lb 2 oz (118.4 kg)   10/29/20 264 lb 2 oz (119.8 kg)   05/09/19 190 lb (86.2 kg)   03/24/19 180 lb (81.6 kg)        Evaluation:  Significant +wt loss of 3.2 kg (2.7%) x past 1 week. Source of weight loss unknown. No nutritional alterations reported. Recommendations:  1.  Will continue to monitor for weight changes and changes in nutrition alterations      Joaquin Martel RD, LD  Office Number: [phone]

## 2020-12-09 ENCOUNTER — HOSPITAL ENCOUNTER (OUTPATIENT)
Dept: RADIATION ONCOLOGY | Facility: MEDICAL CENTER | Age: 30
Discharge: HOME | End: 2020-12-09
Payer: MEDICARE

## 2020-12-09 PROCEDURE — 77014 PR CT GUIDANCE PLACEMENT RAD THERAPY FIELDS: CPT | Performed by: RADIOLOGY

## 2020-12-09 PROCEDURE — 77386 HC NTSTY MODUL RAD TX DLVR CPLX: CPT

## 2020-12-09 PROCEDURE — 77427 RADIATION TX MANAGEMENT X5: CPT | Performed by: RADIOLOGY

## 2020-12-09 PROCEDURE — 77336 RADIATION PHYSICS CONSULT: CPT

## 2020-12-10 ENCOUNTER — HOSPITAL ENCOUNTER (OUTPATIENT)
Dept: RADIATION ONCOLOGY | Facility: MEDICAL CENTER | Age: 30
Discharge: HOME | End: 2020-12-10
Payer: MEDICARE

## 2020-12-10 PROCEDURE — 77386 HC NTSTY MODUL RAD TX DLVR CPLX: CPT

## 2020-12-10 PROCEDURE — 77014 PR CT GUIDANCE PLACEMENT RAD THERAPY FIELDS: CPT | Performed by: RADIOLOGY

## 2020-12-11 ENCOUNTER — APPOINTMENT (OUTPATIENT)
Dept: RADIATION ONCOLOGY | Facility: MEDICAL CENTER | Age: 30
End: 2020-12-11
Payer: MEDICARE

## 2020-12-11 PROCEDURE — 77386 HC NTSTY MODUL RAD TX DLVR CPLX: CPT

## 2020-12-11 PROCEDURE — 77014 PR CT GUIDANCE PLACEMENT RAD THERAPY FIELDS: CPT | Performed by: RADIOLOGY

## 2020-12-14 ENCOUNTER — HOSPITAL ENCOUNTER (OUTPATIENT)
Dept: RADIATION ONCOLOGY | Facility: MEDICAL CENTER | Age: 30
Discharge: HOME | End: 2020-12-14
Payer: MEDICARE

## 2020-12-14 VITALS
HEART RATE: 64 BPM | DIASTOLIC BLOOD PRESSURE: 72 MMHG | RESPIRATION RATE: 16 BRPM | SYSTOLIC BLOOD PRESSURE: 124 MMHG | WEIGHT: 257.5 LBS | BODY MASS INDEX: 33.06 KG/M2 | OXYGEN SATURATION: 97 % | TEMPERATURE: 96.3 F

## 2020-12-14 PROCEDURE — 77014 PR CT GUIDANCE PLACEMENT RAD THERAPY FIELDS: CPT | Performed by: RADIOLOGY

## 2020-12-14 PROCEDURE — 77386 HC NTSTY MODUL RAD TX DLVR CPLX: CPT

## 2020-12-14 NOTE — PROGRESS NOTES
Felix Eddy  12/14/2020  Wt Readings from Last 3 Encounters:   12/14/20 257 lb 8 oz (116.8 kg)   12/07/20 254 lb (115.2 kg)   11/30/20 261 lb 2 oz (118.4 kg)     Body mass index is 33.06 kg/m². PT here for OTV. SOme dryness to the treated area, using aquaphor. No pain. Dr. Elaina Hector to eval. Dr. Elaina Hector gave verbal order for hydrocortisone cream four times daily, 100grams no refills. Called into [facility] on [address] Treatment Area:shoulder    Patient was seen today for weekly visit. Comfort Alteration  Fatigue: None      Nutritional Alteration  Anorexia: No   Nausea: No   Vomiting: No       Skin Alteration   Sensation:dry    Radiation Dermatitis:  Intact [x]     Erythema  []     Discoloration  []     Rash []     Dry desquamation  []     Moist desquamation []       Emotional  Coping: effective      Injury, potential bleeding or infection: none    No results found for: WBC, PLT      /72   Pulse 64   Temp 96.3 °F (35.7 °C) (Oral)   Resp 16   Wt 257 lb 8 oz (116.8 kg)   SpO2 97%   BMI 33.06 kg/m²   Patient Currently in Pain: No                 Assessment/Plan: Patient was seen today for weekly visit.       Darleen Summers

## 2020-12-14 NOTE — PROGRESS NOTES
nt: Nba Mojica     : 1990      Date of Service: 2020    [facility] Oncology  On Treatment Visit (OTV) Note    Diagnosis: Cancer Staging  No matching staging information was found for the patient. Dose Accrued: Completed 2600 cGy out of 4600 cGy, 13 out of 23 treatments    S: Minor degree of skin irritation over the left shoulder. O: There were no vitals taken for this visit. .  Well-appearing, in no apparent distress, alert and fully oriented, answers questions appropriately. He has skin erythema over the left shoulder scar and increase erythema over the skin. IGRT: Set-up images acquired to ensure daily treatment accuracy and precision were reviewed by the physician. A&P: Was given hydrocortisone cream to use 4 times a day. Continue radiotherapy as planned. Moisturize treated area as instructed. We reviewed reasonably anticipated side effects in the upcoming weeks, as well as expected trajectory of recovery. Patient verbalized a good understanding to everything.     Electronically signed by Jesus Wu MD on 2020 at 6:38 PM

## 2020-12-15 ENCOUNTER — HOSPITAL ENCOUNTER (OUTPATIENT)
Dept: RADIATION ONCOLOGY | Facility: MEDICAL CENTER | Age: 30
Discharge: HOME | End: 2020-12-15
Payer: MEDICARE

## 2020-12-15 PROCEDURE — 77386 HC NTSTY MODUL RAD TX DLVR CPLX: CPT

## 2020-12-15 PROCEDURE — 77014 PR CT GUIDANCE PLACEMENT RAD THERAPY FIELDS: CPT | Performed by: RADIOLOGY

## 2020-12-16 ENCOUNTER — HOSPITAL ENCOUNTER (OUTPATIENT)
Dept: RADIATION ONCOLOGY | Facility: MEDICAL CENTER | Age: 30
Discharge: HOME | End: 2020-12-16
Payer: MEDICARE

## 2020-12-16 PROCEDURE — 77336 RADIATION PHYSICS CONSULT: CPT

## 2020-12-16 PROCEDURE — 77014 PR CT GUIDANCE PLACEMENT RAD THERAPY FIELDS: CPT | Performed by: RADIOLOGY

## 2020-12-16 PROCEDURE — 77427 RADIATION TX MANAGEMENT X5: CPT | Performed by: RADIOLOGY

## 2020-12-16 PROCEDURE — 77386 HC NTSTY MODUL RAD TX DLVR CPLX: CPT

## 2020-12-17 ENCOUNTER — HOSPITAL ENCOUNTER (OUTPATIENT)
Dept: RADIATION ONCOLOGY | Facility: MEDICAL CENTER | Age: 30
Discharge: HOME | End: 2020-12-17
Payer: MEDICARE

## 2020-12-17 PROCEDURE — 77014 PR CT GUIDANCE PLACEMENT RAD THERAPY FIELDS: CPT | Performed by: RADIOLOGY

## 2020-12-17 PROCEDURE — 77386 HC NTSTY MODUL RAD TX DLVR CPLX: CPT

## 2020-12-17 NOTE — PROGRESS NOTES
Nutrition Brief: Wt stable    Wt Readings from Last 5 Encounters:   12/14/20 257 lb 8 oz (116.8 kg)   12/07/20 254 lb (115.2 kg)   11/30/20 261 lb 2 oz (118.4 kg)   10/29/20 264 lb 2 oz (119.8 kg)   05/09/19 190 lb (86.2 kg)        Evaluation:  Pt weight stable at this time. No nutritional alterations reported. Recommendations:  1.  Will continue to monitor for weight changes and changes in nutrition alterations      Shahzad Ugarte RD, LD  Office Number: [phone]

## 2020-12-21 ENCOUNTER — HOSPITAL ENCOUNTER (OUTPATIENT)
Dept: RADIATION ONCOLOGY | Facility: MEDICAL CENTER | Age: 30
Discharge: HOME | End: 2020-12-21
Attending: RADIOLOGY
Payer: MEDICARE

## 2020-12-21 ENCOUNTER — HOSPITAL ENCOUNTER (OUTPATIENT)
Dept: RADIATION ONCOLOGY | Facility: MEDICAL CENTER | Age: 30
Discharge: HOME | End: 2020-12-21
Payer: MEDICARE

## 2020-12-21 VITALS
OXYGEN SATURATION: 97 % | BODY MASS INDEX: 32.23 KG/M2 | RESPIRATION RATE: 16 BRPM | WEIGHT: 251 LBS | DIASTOLIC BLOOD PRESSURE: 81 MMHG | TEMPERATURE: 97.1 F | SYSTOLIC BLOOD PRESSURE: 137 MMHG | HEART RATE: 87 BPM

## 2020-12-21 PROCEDURE — 77386 HC NTSTY MODUL RAD TX DLVR CPLX: CPT | Performed by: RADIOLOGY

## 2020-12-21 PROCEDURE — 77014 PR CT GUIDANCE PLACEMENT RAD THERAPY FIELDS: CPT | Performed by: RADIOLOGY

## 2020-12-21 NOTE — PROGRESS NOTES
[facility]  12/21/2020  Wt Readings from Last 3 Encounters:   12/21/20 251 lb (113.9 kg)   12/14/20 257 lb 8 oz (116.8 kg)   12/07/20 254 lb (115.2 kg)     Body mass index is 32.23 kg/m². Pt here for OTV. Denies pain in the shoulder. Dr. Ambrose Zhu to eval.    Treatment Area:left shoulder    Patient was seen today for weekly visit. Comfort Alteration  Fatigue: None      Nutritional Alteration  Anorexia: No   Nausea: No   Vomiting: No       Skin Alteration   Sensation:black dots. Patient described his scar as \"crunchy\"    Radiation Dermatitis:  Intact []     Erythema  []     Discoloration  [x]     Rash []     Dry desquamation  []     Moist desquamation []       Emotional  Coping: effective      Injury, potential bleeding or infection: recommended to use hydrocortisone cream, pt states he has not picked it up yet. No results found for: WBC, PLT      /81   Pulse 87   Temp 97.1 °F (36.2 °C) (Oral)   Resp 16   Wt 251 lb (113.9 kg)   SpO2 97%   BMI 32.23 kg/m²   Patient Currently in Pain: No                 Assessment/Plan: Patient was seen today for weekly visit.       Anay Lucas

## 2020-12-23 ENCOUNTER — HOSPITAL ENCOUNTER (OUTPATIENT)
Dept: RADIATION ONCOLOGY | Facility: MEDICAL CENTER | Age: 30
Discharge: HOME | End: 2020-12-23
Attending: RADIOLOGY
Payer: MEDICARE

## 2020-12-23 PROCEDURE — 77386 HC NTSTY MODUL RAD TX DLVR CPLX: CPT | Performed by: RADIOLOGY

## 2020-12-23 PROCEDURE — 77014 PR CT GUIDANCE PLACEMENT RAD THERAPY FIELDS: CPT | Performed by: RADIOLOGY

## 2020-12-24 ENCOUNTER — HOSPITAL ENCOUNTER (OUTPATIENT)
Dept: RADIATION ONCOLOGY | Facility: MEDICAL CENTER | Age: 30
Discharge: HOME | End: 2020-12-24
Attending: RADIOLOGY
Payer: MEDICARE

## 2020-12-24 PROCEDURE — 77386 HC NTSTY MODUL RAD TX DLVR CPLX: CPT

## 2020-12-24 PROCEDURE — 77014 PR CT GUIDANCE PLACEMENT RAD THERAPY FIELDS: CPT | Performed by: RADIOLOGY

## 2020-12-28 ENCOUNTER — HOSPITAL ENCOUNTER (OUTPATIENT)
Dept: RADIATION ONCOLOGY | Facility: MEDICAL CENTER | Age: 30
Discharge: HOME | End: 2020-12-28
Payer: MEDICARE

## 2020-12-28 VITALS
SYSTOLIC BLOOD PRESSURE: 124 MMHG | RESPIRATION RATE: 16 BRPM | OXYGEN SATURATION: 100 % | BODY MASS INDEX: 33.25 KG/M2 | DIASTOLIC BLOOD PRESSURE: 84 MMHG | HEART RATE: 66 BPM | WEIGHT: 259 LBS | TEMPERATURE: 96.8 F

## 2020-12-28 PROCEDURE — 77014 PR CT GUIDANCE PLACEMENT RAD THERAPY FIELDS: CPT | Performed by: RADIOLOGY

## 2020-12-28 PROCEDURE — 77427 RADIATION TX MANAGEMENT X5: CPT | Performed by: RADIOLOGY

## 2020-12-28 PROCEDURE — 77386 HC NTSTY MODUL RAD TX DLVR CPLX: CPT

## 2020-12-28 NOTE — PROGRESS NOTES
Arley Bernard  12/28/2020  Wt Readings from Last 3 Encounters:   12/28/20 259 lb (117.5 kg)   12/21/20 251 lb (113.9 kg)   12/14/20 257 lb 8 oz (116.8 kg)     Body mass index is 33.25 kg/m². PT here for OTV. Patient states some itching to the shoulder area. Dr. George Edmonds to eval.    Treatment Area:left shoulder    Patient was seen today for weekly visit. Comfort Alteration  Fatigue: None      Nutritional Alteration  Anorexia: No   Nausea: No   Vomiting: No     Skin Alteration   Sensation:some itching    Radiation Dermatitis:  Intact []     Erythema  []     Discoloration  [x]     Rash []     Dry desquamation  []     Moist desquamation []       Emotional  Coping: effective      Injury, potential bleeding or infection: none    No results found for: WBC, PLT      /84   Pulse 66   Temp 96.8 °F (36 °C) (Oral)   Resp 16   Wt 259 lb (117.5 kg)   SpO2 100%   BMI 33.25 kg/m²   Patient Currently in Pain: No                 Assessment/Plan: Patient was seen today for weekly visit.       Yoly Hassan

## 2020-12-28 NOTE — PROGRESS NOTES
nt: Kaleigh Galloway     : 1990      Date of Service: 2020    [facility] Oncology  On Treatment Visit (OTV) Note    Diagnosis: Cancer Staging  No matching staging information was found for the patient. Dose Accrued: Completed 4000 out of 4600 cGy 20 out of 23 treatment. S: No new complaint    O: /84   Pulse 66   Temp 96.8 °F (36 °C) (Oral)   Resp 16   Wt 259 lb (117.5 kg)   SpO2 100%   BMI 33.25 kg/m² . Well-appearing, in no apparent distress, alert and fully oriented, answers questions appropriately. Skin remarkable for erythema. IGRT: Set-up images acquired to ensure daily treatment accuracy and precision were reviewed by the physician. A&P: Continue radiotherapy as planned. Moisturize treated area as instructed. We reviewed reasonably anticipated side effects in the upcoming weeks, as well as expected trajectory of recovery. Patient verbalized a good understanding to everything.     Electronically signed by Brit Jacbos MD on 2020 at 6:23 PM

## 2020-12-29 ENCOUNTER — HOSPITAL ENCOUNTER (OUTPATIENT)
Dept: RADIATION ONCOLOGY | Facility: MEDICAL CENTER | Age: 30
Discharge: HOME | End: 2020-12-29
Payer: MEDICARE

## 2020-12-29 PROCEDURE — 77014 PR CT GUIDANCE PLACEMENT RAD THERAPY FIELDS: CPT | Performed by: RADIOLOGY

## 2020-12-29 PROCEDURE — 77386 HC NTSTY MODUL RAD TX DLVR CPLX: CPT | Performed by: RADIOLOGY

## 2020-12-30 ENCOUNTER — HOSPITAL ENCOUNTER (OUTPATIENT)
Dept: RADIATION ONCOLOGY | Facility: MEDICAL CENTER | Age: 30
Discharge: HOME | End: 2020-12-30
Attending: RADIOLOGY
Payer: MEDICARE

## 2020-12-30 PROCEDURE — 77014 PR CT GUIDANCE PLACEMENT RAD THERAPY FIELDS: CPT | Performed by: RADIOLOGY

## 2020-12-30 PROCEDURE — 77386 HC NTSTY MODUL RAD TX DLVR CPLX: CPT | Performed by: RADIOLOGY

## 2020-12-31 ENCOUNTER — HOSPITAL ENCOUNTER (OUTPATIENT)
Dept: RADIATION ONCOLOGY | Facility: MEDICAL CENTER | Age: 30
Discharge: HOME | End: 2020-12-31
Attending: RADIOLOGY
Payer: MEDICARE

## 2020-12-31 PROCEDURE — 77386 HC NTSTY MODUL RAD TX DLVR CPLX: CPT | Performed by: RADIOLOGY

## 2020-12-31 PROCEDURE — 77336 RADIATION PHYSICS CONSULT: CPT | Performed by: RADIOLOGY

## 2020-12-31 PROCEDURE — 77014 PR CT GUIDANCE PLACEMENT RAD THERAPY FIELDS: CPT | Performed by: RADIOLOGY

## 2021-01-04 ENCOUNTER — HOSPITAL ENCOUNTER (OUTPATIENT)
Dept: RADIATION ONCOLOGY | Facility: MEDICAL CENTER | Age: 31
Discharge: HOME | End: 2021-01-04
Attending: RADIOLOGY
Payer: MEDICARE

## 2021-01-04 ENCOUNTER — HOSPITAL ENCOUNTER (OUTPATIENT)
Dept: RADIATION ONCOLOGY | Facility: MEDICAL CENTER | Age: 31
Discharge: HOME | End: 2021-01-04
Payer: MEDICARE

## 2021-01-04 ENCOUNTER — APPOINTMENT (OUTPATIENT)
Dept: RADIATION ONCOLOGY | Facility: MEDICAL CENTER | Age: 31
End: 2021-01-04
Attending: RADIOLOGY
Payer: MEDICARE

## 2021-01-04 VITALS
TEMPERATURE: 99 F | HEART RATE: 75 BPM | DIASTOLIC BLOOD PRESSURE: 85 MMHG | WEIGHT: 258.5 LBS | OXYGEN SATURATION: 99 % | SYSTOLIC BLOOD PRESSURE: 131 MMHG | BODY MASS INDEX: 33.19 KG/M2 | RESPIRATION RATE: 16 BRPM

## 2021-01-04 PROCEDURE — 77300 RADIATION THERAPY DOSE PLAN: CPT | Performed by: RADIOLOGY

## 2021-01-04 PROCEDURE — 77280 THER RAD SIMULAJ FIELD SMPL: CPT | Performed by: RADIOLOGY

## 2021-01-04 PROCEDURE — 77412 RADIATION TX DELIVERY LVL 3: CPT | Performed by: RADIOLOGY

## 2021-01-04 PROCEDURE — 77334 RADIATION TREATMENT AID(S): CPT | Performed by: RADIOLOGY

## 2021-01-04 PROCEDURE — 77321 SPECIAL TELETX PORT PLAN: CPT | Performed by: RADIOLOGY

## 2021-01-04 PROCEDURE — 99999 PR OFFICE/OUTPT VISIT,PROCEDURE ONLY: CPT | Performed by: RADIOLOGY

## 2021-01-04 NOTE — PROGRESS NOTES
nt: Goyo Hernández     : 1990      Date of Service: 2021    [facility] Oncology  On Treatment Visit (OTV) Note    Diagnosis: Cancer Staging  No matching staging information was found for the patient. Dose Accrued: Completed 4600 cGy out of [place] [address], 23 out of 23 +200 out of 2000 cGy, 1 out of 10 treatments. S: Complained of skin irritation he has been scratching it and washing it. O: There were no vitals taken for this visit. .  Well-appearing, in no apparent distress, alert and fully oriented, answers questions appropriately. Scratches ana erythema and ulceration secondary to scratches were seen over the skin. IGRT: Set-up images acquired to ensure daily treatment accuracy and precision were reviewed by the physician. A&P: Was advised to avoid scratching and use any irritants over the skin and he was given Silvadene cream to use 3 times a day. Continue radiotherapy as planned. Moisturize treated area as instructed. We reviewed reasonably anticipated side effects in the upcoming weeks, as well as expected trajectory of recovery. Patient verbalized a good understanding to everything.     Electronically signed by Carol Albright MD on 2021 at 6:26 PM

## 2021-01-04 NOTE — PROGRESS NOTES
Danielle Valentine  1/4/2021  Wt Readings from Last 3 Encounters:   01/04/21 258 lb 8 oz (117.3 kg)   12/28/20 259 lb (117.5 kg)   12/21/20 251 lb (113.9 kg)     Body mass index is 33.19 kg/m². Pt here for OTV. Patient states irritation to the shoulder. Dr. Don Johansen to eval.    Treatment Area:left shoulder    Patient was seen today for weekly visit. Comfort Alteration  Fatigue: None      Nutritional Alteration  Anorexia: No   Nausea: No   Vomiting: No       Skin Alteration   Sensation:itching    Radiation Dermatitis:  Intact []     Erythema  []     Discoloration  [x]     Rash []     Dry desquamation  []     Moist desquamation []       Emotional  Coping: effective      Injury, potential bleeding or infection: none    No results found for: WBC, PLT      /85   Pulse 75   Temp 99 °F (37.2 °C) (Oral)   Resp 16   Wt 258 lb 8 oz (117.3 kg)   SpO2 99%   BMI 33.19 kg/m²   Patient Currently in Pain: No                 Assessment/Plan: Patient was seen today for weekly visit.       Ivania Dominguez

## 2021-01-05 ENCOUNTER — HOSPITAL ENCOUNTER (OUTPATIENT)
Dept: RADIATION ONCOLOGY | Facility: MEDICAL CENTER | Age: 31
Discharge: HOME | End: 2021-01-05
Attending: RADIOLOGY
Payer: MEDICARE

## 2021-01-05 PROCEDURE — 77427 RADIATION TX MANAGEMENT X5: CPT | Performed by: RADIOLOGY

## 2021-01-05 PROCEDURE — 77412 RADIATION TX DELIVERY LVL 3: CPT | Performed by: RADIOLOGY

## 2021-01-06 ENCOUNTER — HOSPITAL ENCOUNTER (OUTPATIENT)
Dept: RADIATION ONCOLOGY | Facility: MEDICAL CENTER | Age: 31
Discharge: HOME | End: 2021-01-06
Attending: RADIOLOGY
Payer: MEDICARE

## 2021-01-06 PROCEDURE — 77336 RADIATION PHYSICS CONSULT: CPT | Performed by: RADIOLOGY

## 2021-01-06 PROCEDURE — 77412 RADIATION TX DELIVERY LVL 3: CPT | Performed by: RADIOLOGY

## 2021-01-07 ENCOUNTER — HOSPITAL ENCOUNTER (OUTPATIENT)
Dept: RADIATION ONCOLOGY | Facility: MEDICAL CENTER | Age: 31
Discharge: HOME | End: 2021-01-07
Attending: RADIOLOGY
Payer: MEDICARE

## 2021-01-07 PROCEDURE — 77412 RADIATION TX DELIVERY LVL 3: CPT | Performed by: RADIOLOGY

## 2021-01-08 ENCOUNTER — HOSPITAL ENCOUNTER (OUTPATIENT)
Dept: RADIATION ONCOLOGY | Facility: MEDICAL CENTER | Age: 31
Discharge: HOME | End: 2021-01-08
Attending: RADIOLOGY
Payer: MEDICARE

## 2021-01-08 PROCEDURE — 77412 RADIATION TX DELIVERY LVL 3: CPT | Performed by: RADIOLOGY

## 2021-01-11 ENCOUNTER — HOSPITAL ENCOUNTER (OUTPATIENT)
Dept: RADIATION ONCOLOGY | Facility: MEDICAL CENTER | Age: 31
Discharge: HOME | End: 2021-01-11
Attending: RADIOLOGY
Payer: MEDICARE

## 2021-01-11 ENCOUNTER — HOSPITAL ENCOUNTER (OUTPATIENT)
Dept: RADIATION ONCOLOGY | Facility: MEDICAL CENTER | Age: 31
Discharge: HOME | End: 2021-01-11
Payer: MEDICARE

## 2021-01-11 VITALS
OXYGEN SATURATION: 98 % | DIASTOLIC BLOOD PRESSURE: 61 MMHG | RESPIRATION RATE: 16 BRPM | BODY MASS INDEX: 33.67 KG/M2 | SYSTOLIC BLOOD PRESSURE: 111 MMHG | HEART RATE: 86 BPM | WEIGHT: 262.25 LBS | TEMPERATURE: 97.5 F

## 2021-01-11 PROCEDURE — 77412 RADIATION TX DELIVERY LVL 3: CPT | Performed by: RADIOLOGY

## 2021-01-11 NOTE — PROGRESS NOTES
Stacy Martinez  1/11/2021  Wt Readings from Last 3 Encounters:   01/11/21 262 lb 4 oz (119 kg)   01/04/21 258 lb 8 oz (117.3 kg)   12/28/20 259 lb (117.5 kg)     Body mass index is 33.67 kg/m². Pt here for OTV. Patient states some issues with skin. DR. johnson saw patient and verbal order for silvadene cream 1% three times a day with 1 refill. Called into [facility] on [address]. Treatment Area:left shoulder    Patient was seen today for weekly visit. Comfort Alteration  Fatigue: None      Nutritional Alteration  Anorexia: No   Nausea: No   Vomiting: No       Elimination Alterations  Constipation: no  Diarrhea:  no    Skin Alteration   Sensation:sore, itching, dry, red and black. Radiation Dermatitis:  Intact []     Erythema  []     Discoloration  [x]     Rash []     Dry desquamation  [x]     Moist desquamation []       Emotional  Coping: effective      Injury, potential bleeding or infection: none    No results found for: WBC, PLT      /61   Pulse 86   Temp 97.5 °F (36.4 °C) (Oral)   Resp 16   Wt 262 lb 4 oz (119 kg)   SpO2 98%   BMI 33.67 kg/m²   Patient Currently in Pain: Yes  Pain Assessment: 0-10            Assessment/Plan: Patient was seen today for weekly visit.       Samir Daniels

## 2021-01-11 NOTE — PROGRESS NOTES
nt: Lauren Felder     : 1990      Date of Service: 2021    [address] [facility] Oncology  On Treatment Visit (OTV) Note    Diagnosis: Cancer Staging  No matching staging information was found for the patient. Dose Accrued: Completed 4600 cGy out of 4600 cGy 23 out of 23 treatments +1200 cGy out of 2000 cGy, 6 out of 10 treatments    S: Of skin irritation and skin is peeling    O: There were no vitals taken for this visit. .  Well-appearing, in no apparent distress, alert and fully oriented, answers questions appropriately. He has dry desquamation and the dry skin was seen over his shoulder  IGRT: Set-up images acquired to ensure daily treatment accuracy and precision were reviewed by the physician. A&P: Was advised to start on Silvadene cream 3 times a day continue radiotherapy as planned. Moisturize treated area as instructed. We reviewed reasonably anticipated side effects in the upcoming weeks, as well as expected trajectory of recovery. Patient verbalized a good understanding to everything.     Electronically signed by Daniel Matthews MD on 2021 at 5:02 PM

## 2021-01-12 ENCOUNTER — HOSPITAL ENCOUNTER (OUTPATIENT)
Dept: RADIATION ONCOLOGY | Facility: MEDICAL CENTER | Age: 31
Discharge: HOME | End: 2021-01-12
Attending: RADIOLOGY
Payer: MEDICARE

## 2021-01-12 PROCEDURE — 77412 RADIATION TX DELIVERY LVL 3: CPT | Performed by: RADIOLOGY

## 2021-01-12 PROCEDURE — 77427 RADIATION TX MANAGEMENT X5: CPT | Performed by: RADIOLOGY

## 2021-01-13 ENCOUNTER — HOSPITAL ENCOUNTER (OUTPATIENT)
Dept: RADIATION ONCOLOGY | Facility: MEDICAL CENTER | Age: 31
Discharge: HOME | End: 2021-01-13
Attending: RADIOLOGY
Payer: MEDICARE

## 2021-01-13 PROCEDURE — 77336 RADIATION PHYSICS CONSULT: CPT | Performed by: RADIOLOGY

## 2021-01-13 PROCEDURE — 77412 RADIATION TX DELIVERY LVL 3: CPT | Performed by: RADIOLOGY

## 2021-01-14 ENCOUNTER — HOSPITAL ENCOUNTER (OUTPATIENT)
Dept: RADIATION ONCOLOGY | Facility: MEDICAL CENTER | Age: 31
Discharge: HOME | End: 2021-01-14
Attending: RADIOLOGY
Payer: MEDICARE

## 2021-01-14 PROCEDURE — 77412 RADIATION TX DELIVERY LVL 3: CPT | Performed by: RADIOLOGY

## 2021-01-14 NOTE — PROGRESS NOTES
Patient came in with girlfriend to ask about his shoulder and if it is infected? He went to urgent care and they gave him Cephalexin 500mgs. It is smelling and draining yellow fluid and it has painful. Dr. Radha Yang to eval and recommended use the silvadene cream and put tefla over it when wearing a shirt and keep the shoulder open to air at home.

## 2021-01-15 ENCOUNTER — HOSPITAL ENCOUNTER (OUTPATIENT)
Dept: RADIATION ONCOLOGY | Facility: MEDICAL CENTER | Age: 31
Discharge: HOME | End: 2021-01-15
Attending: RADIOLOGY
Payer: MEDICARE

## 2021-01-15 PROCEDURE — 77412 RADIATION TX DELIVERY LVL 3: CPT | Performed by: RADIOLOGY

## 2021-02-12 ENCOUNTER — TELEPHONE (OUTPATIENT)
Dept: RADIATION ONCOLOGY | Facility: MEDICAL CENTER | Age: 31
End: 2021-02-12

## 2021-02-12 NOTE — TELEPHONE ENCOUNTER
Name: Leena Kang  MRN: [id number]  Date: 2/12/2021    Diagnosis: Cancer Staging  No matching staging information was found for the patient. Treatment Completion Date: 1-15-21    Subjective:Patient states his shoulder is all healed, but dry at times and he is using aquaphor. Confirmed RO follow-up appointment:   [x]   Yes  []   No  []   N/A    Confirmed follow-up appointments with other referring physicians:   []   Yes  []   No  [x]   N/A    Instructions: keep using aquaphor.     Reviewed and approved by:

## 2021-03-11 ENCOUNTER — CLINICAL DOCUMENTATION (OUTPATIENT)
Dept: RADIATION ONCOLOGY | Facility: MEDICAL CENTER | Age: 31
End: 2021-03-11

## 2021-03-11 NOTE — PROGRESS NOTES
Tiffany House MD, ABR, FICS. Cell [phone]                                                                               Office Phone [address] [facility] [place], [address]      RADIATION ONCOLOGY END OF TREATMENT SUMMARY:      Patient ID:   Leena Kang  : 1990   MRN: [id number]    DIAGNOSIS:  Cancer Staging  No matching staging information was found for the patient. Dear  No ref. provider found: Thank you for referring Leena Kang to me for evaluation and treatment. Below is a summary of the patient's recently completed radiation course. TREATMENT DETAILS, including Technique:    Started on 2020 and was completed on 1/15/2021. Patient received 4600 cGy delivered in 23 treatment to his left shoulder using 6 MV energy and the technique was rapid followed by electron beam boost to deliver 2000 cGy in 10 treatment at 200 cGy per fraction using 12 MAV electrons as a boost.       CLINICAL COURSE and Follow up.     Patient was seen and examined weekly during his course of treatment and essentially developed expected acute reactions in the form of erythema desquamation and that was managed with hydrocortisone cream initially but toward the end of the treatment patient was given Silvadene cream.    Patient was given follow-up appointment and he was advised to be seen by his surgeon and his family doctor. I would like thank you very much for letting me participate in his care and I will keep you updated on him. Electronically signed by Allegra Shah MD on 3/11/2021 at 3:57 Hemalatha Daniel MD, ABR, FICS. Cell [phone] [facility] [address]    Dear  No ref.  provider found:

## 2021-04-22 ENCOUNTER — HOSPITAL ENCOUNTER (OUTPATIENT)
Dept: RADIATION ONCOLOGY | Facility: MEDICAL CENTER | Age: 31
Discharge: HOME | End: 2021-04-22
Attending: RADIOLOGY
Payer: MEDICARE

## 2021-04-22 VITALS
DIASTOLIC BLOOD PRESSURE: 66 MMHG | OXYGEN SATURATION: 99 % | BODY MASS INDEX: 33.9 KG/M2 | HEART RATE: 70 BPM | SYSTOLIC BLOOD PRESSURE: 134 MMHG | WEIGHT: 264 LBS | RESPIRATION RATE: 18 BRPM | TEMPERATURE: 97.3 F

## 2021-04-22 PROCEDURE — 99211 OFF/OP EST MAY X REQ PHY/QHP: CPT | Performed by: RADIOLOGY

## 2021-04-22 PROCEDURE — 99214 OFFICE O/P EST MOD 30 MIN: CPT | Performed by: RADIOLOGY

## 2021-04-22 NOTE — PROGRESS NOTES
FOLLOW UP NOTE:                                                                                                                                                                                                          Lelia NASH MD, ABR, FICS. Cell [phone]                                                                               Office Phone [address], [facility] [address], [address]                            Date of Service: 2021            RADIATION ONCOLOGY FOLLOW UP NOTE    Patient ID:   Sheldon King  : 1990   MRN: [id number]    DIAGNOSIS:  Cancer Staging  No matching staging information was found for the patient. INTERVAL HISTORY:     No new symptoms in his left shoulder has been healing to suggest some tiny crusted air        MEDICATIONS:    Current Outpatient Medications:     silver sulfADIAZINE (SILVADENE) 1 % cream, Apply topically daily Apply topically daily 3 times to affected area., Disp: , Rfl:     acetaminophen (TYLENOL) 325 MG tablet, Take 1 tablet by mouth every 6 hours as needed for Pain, Disp: 120 tablet, Rfl: 0    ibuprofen (IBU) 400 MG tablet, Take 1 tablet by mouth every 6 hours as needed for Pain, Disp: 120 tablet, Rfl: 0    ALLERGIES:  No Known Allergies      REVIEW OF SYSTEMS:    A full 14 point review of systems was performed and assessed and found to be negative except as noted above.       PHYSICAL EXAMINATION:      ECO     VITAL SIGNS: /66   Pulse 70   Temp 97.3 °F (36.3 °C) (Temporal)   Resp 18   Wt 264 lb (119.7 kg)   SpO2 99%   BMI 33.90 kg/m²     [unfilled]    Patient looks well in no pain or distress. No supraclavicular node enlargement. Left shoulder revealed no evidence of recurrent disease. Small crust on less than a centimeter that is there. No axillary node enlargement. Remaining of his exam is unremarkable          LABS:  No results found for: WBC, NEUTROABS, HGB, PLT  No results found for: , CEA  No results found for: PSA    IMAGING:         ASSESSMENT:  Kendy Arambula is a 31 y.o. male has no evidence of recurrence of his tumor. Doing well       PLAN:    Was given 6 months follow-up appointment again I like thank you very much for letting me participate in his care and will keep you updated on him. Patient is recommended to come back in   months for another follow up visit and exam, or can call to come see us sooner if symptoms change. Patient was in agreement with my recommendations. All questions were answered to their satisfaction. Patient was advised to contact us anytime should they have any questions or concerns. Electronically signed by Juan Antonio Neri MD on 4/22/2021 at 3:32 PM        Medications Prescribed:   New Prescriptions    No medications on file       Orders: No orders of the defined types were placed in this encounter.       CC:  Patient Care Team:  My Dumont DO as PCP - General (General Surgery)  My Dumont DO as Surgeon (General Surgery)  Juan Antonio Neri MD as Consulting Physician (Oncology)

## 2021-04-22 NOTE — PROGRESS NOTES
Laron Mejia  4/22/2021  3:26 PM      Vitals:    04/22/21 1514   BP: 134/66   Pulse:    Resp:    Temp:    SpO2:     :  Patient Currently in Pain: (discomfort \"irritating at treatment area\" per pt)             Wt Readings from Last 1 Encounters:   04/22/21 264 lb (119.7 kg)                Current Outpatient Medications:     silver sulfADIAZINE (SILVADENE) 1 % cream, Apply topically daily Apply topically daily 3 times to affected area., Disp: , Rfl:     acetaminophen (TYLENOL) 325 MG tablet, Take 1 tablet by mouth every 6 hours as needed for Pain, Disp: 120 tablet, Rfl: 0    ibuprofen (IBU) 400 MG tablet, Take 1 tablet by mouth every 6 hours as needed for Pain, Disp: 120 tablet, Rfl: 0    Immunizations:    Influenza status:    []   Current   [x]   Patient declined    Pneumococcal status:  []   Current  [x]   Patient declined  Covid status:   []  Dose #1:                     []  Dose #2:               [x]   Patient declined    Smoking Status:    [x] Smoker - PPD: 1/2 to 1 PPD    [] Nonsmoker - Quit Date:               [] Never a smoker      Cancer Screening:  Colonoscopy   [] Current       [x] Not current   [] Not current, but scheduled   [] NA  Mammogram   [] Current       [] Not current   [] Not current, but scheduled   [x] NA  Prostate           [] Current       [x] Not current   [] Not current, but scheduled   [] NA  PAP/Pelvic      [] Current       [] Not current   [] Not current, but scheduled   [x] NA  Skin                 [] Current       [x] Not current   [] Not current, but scheduled   [] NA     Hormone:  Lupron []   Last dose given:           Next dose due:   Eligard []   Last dose given:           Next dose due:   Aromatase Inhibitors []   Medication name:   N/A:  []             *BREAST Patient only:    Lymphedema Eval:   [] left arm      [] right arm  Location:     Measurement (cm)    Upper Bicep :    Lower Bicep :         FALLS RISK SCREEN  Instructions:  Assess the patient and enter the appropriate indicators that are present for fall risk identification. Total the numbers entered and assign a fall risk score from Table 2.  Reassess patient at a minimum every 12 weeks or with status change. Assessment   Date  4/22/2021     1. Mental Ability: confusion/cognitively impaired 0     2. Elimination Issues: incontinence, frequency 0       3. Ambulatory: use of assistive devices (walker, cane, off-loading devices),        attached to equipment (IV pole, oxygen) 0     4. Sensory Limitations: dizziness, vertigo, impaired vision 0     5. Age less than 65        0     6. Age 72 or greater 0     7. Medication: diuretics, strong analgesics, hypnotics, sedatives,        antihypertensive agents 0   8. Falls:  recent history of falls within the last 3 months (not to include slipping or        tripping) 0   TOTAL 0    If score of 4 or greater was education given? No           TABLE 2   Risk Score Risk Level Plan of Care   0-3 Little or  No Risk 1. Provide assistance as indicated for ambulation activities  2. Reorient confused/cognitively impaired patient  3. Chair/bed in low position, stretcher/bed with siderails up except when performing patient care activities  5. Educate patient/family/caregiver on falls prevention  6.  Reassess in 12 weeks or with any noted change in patient condition which places them at a risk for a fall   4-6 Moderate Risk 1. Provide assistance as indicated for ambulation activities  2. Reorient confused/cognitively impaired patient  3. Chair/bed in low position, stretcher/bed with siderails up except when performing patient care activities  4. Educate patient/family/caregiver on falls prevention     7 or   Higher High Risk 1. Place patient in easily observable treatment room  2. Patient attended at all times by family member or staff  3. Provide assistance as indicated for ambulation activities  4. Reorient confused/cognitively impaired patient  5.   Chair/bed in low position, stretcher/bed with siderails up except when performing patient care activities  6. Educate patient/family/caregiver on falls prevention         PLAN: Patient is seen today in follow up. Denies pain, but notes treatment area \"irritating\" at times. Pt still has scab on treatment area that oozes with yellow discharge from time to time. Especially after a warm shower. No redness or warmth note at treatment area. Dr. Estrella Mccloud updated and examined pt. Per MD pt will follow up in 6  Months.          Keren Ulloa

## 2021-10-22 ENCOUNTER — HOSPITAL ENCOUNTER (OUTPATIENT)
Dept: RADIATION ONCOLOGY | Facility: MEDICAL CENTER | Age: 31
Discharge: HOME | End: 2021-10-22
Attending: RADIOLOGY
Payer: MEDICARE

## 2021-10-22 VITALS
WEIGHT: 260.5 LBS | HEART RATE: 73 BPM | TEMPERATURE: 95.1 F | BODY MASS INDEX: 33.45 KG/M2 | DIASTOLIC BLOOD PRESSURE: 84 MMHG | OXYGEN SATURATION: 98 % | RESPIRATION RATE: 16 BRPM | SYSTOLIC BLOOD PRESSURE: 121 MMHG

## 2021-10-22 DIAGNOSIS — C49.12: Primary | ICD-10-CM

## 2021-10-22 PROCEDURE — 99213 OFFICE O/P EST LOW 20 MIN: CPT | Performed by: RADIOLOGY

## 2021-10-22 PROCEDURE — 99212 OFFICE O/P EST SF 10 MIN: CPT | Performed by: RADIOLOGY

## 2021-10-22 NOTE — PROGRESS NOTES
[facility]            Radiation Oncology          [address] [facility]          [place] [address] [place], [address]        Sharol Mcburney: [phone]        F: [phone]       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 Excision and re-excision with negative margins 10/2020  2. Adjuvant radiation therapy 64Gy/32fx (of planned 33fx) completed 2021      INTERVAL HISTORY:   Rocio Stallworth is a 27 y.o.. male who presents for follow up of the above diagnosis and treatment history. He is doing well and does not have any new issues or problems. He has no problems with range of motion of the arm and denies any swelling in the area either upper extremity. There are treatment-related changes but no other issues otherwise has not seen a surgeon since surgery. He denies any chest pain, cough, hemoptysis, new bony pain, or involuntary weight loss. He is otherwise feeling healthy and well    MEDICATIONS:    Current Outpatient Medications:     silver sulfADIAZINE (SILVADENE) 1 % cream, Apply topically daily Apply topically daily 3 times to affected area., Disp: , Rfl:     acetaminophen (TYLENOL) 325 MG tablet, Take 1 tablet by mouth every 6 hours as needed for Pain, Disp: 120 tablet, Rfl: 0    ibuprofen (IBU) 400 MG tablet, Take 1 tablet by mouth every 6 hours as needed for Pain, Disp: 120 tablet, Rfl: 0    ALLERGIES:  No Known Allergies      REVIEW OF SYSTEMS:    A full 14 point review of systems was performed and assessed and found to be negative except as noted above. PHYSICAL EXAMINATION:    CHAPERONE: Not Required    ECO Asymptomatic    VITAL SIGNS: There were no vitals taken for this visit.   GENERAL:  General appearance is that of a well-nourished, well-developed in no apparent distress. HEENT: Normocephalic, atraumatic, EOMI, moist mucosa, no erythema. Indirect exam .  NECK:  No adenopathy or a palpable thyroid mass, trachea is midline. LYMPHATICS: No cervical, supraclavicular, or axillary adenopathy. HEART:  Normal rate and regular rhythm, normal heart sounds. LUNGS:  Pulmonary effort normal. Normal breath sounds. ABDOMEN:  Soft, nontender, non distended, and no hepatosplenomegaly. EXTREMITIES:  No edema. No calf tenderness. MSK:  No spinal tenderness. Normal ROM. NEUROLOGICAL: Alert and oriented. Strength and sensation intact bilaterally. No focal deficits. PSYCH: Mood normal, behavior normal.  SKIN: No erythema, no desquamation. Scarring and treatment related changes left shoulder but no lumps/bumps or other issues. LABS:  No results found for: WBC, NEUTROABS, HGB, PLT  No results found for: , CEA  No results found for: PSA    IMAGING:   None new      ASSESSMENT AND PLAN:  Ramana Pineda is a 27 y.o. male with a history of fibrosarcoma protuberans with fibrosarcomatous transformation 10 cm in size of the left shoulder status post excision and reexcision with negative margins and adjuvant radiation therapy 64Gy/32fx completed 1/2021 who is healed well from the acute toxicities of radiation therapy and presents for follow-up. He is doing well and has no evidence disease on exam.  Since I seem to be the only one following him I am going to order a CT chest for surveillance of the lungs because the sarcomatous transformation and see him back in 6 months. When the results are available I will call and let him know otherwise I will see him back for follow-up next year. He knows to call anytime any questions concerns may arise interim. Patient is recommended to come back in 6 months for another follow up visit and exam, or can call to come see us sooner if symptoms change.     Patient was in agreement with my recommendations. All questions were answered to their satisfaction. Patient was advised to contact us anytime should they have any questions or concerns. Time spent 35 minutes with 50% face-to-face with patient    Electronically signed by Seble Washington MD on 10/22/2021 at 11:37 AM        Medications Prescribed:   New Prescriptions    No medications on file       Orders: No orders of the defined types were placed in this encounter.       CC:  Patient Care Team:  Yuli Bermudez DO as PCP - General (General Surgery)  Yuli Bermudez DO as Surgeon (General Surgery)  Sharif Reaves MD as Consulting Physician (Radiation Oncology)

## 2021-11-02 ENCOUNTER — HOSPITAL ENCOUNTER (OUTPATIENT)
Dept: CT IMAGING | Age: 31
Discharge: HOME | End: 2021-11-04
Payer: MEDICARE

## 2021-11-02 DIAGNOSIS — C49.12: ICD-10-CM

## 2021-11-02 PROCEDURE — 71250 CT THORAX DX C-: CPT

## 2022-04-25 ENCOUNTER — TELEPHONE (OUTPATIENT)
Dept: RADIATION ONCOLOGY | Facility: MEDICAL CENTER | Age: 32
End: 2022-04-25